# Patient Record
Sex: MALE | Race: BLACK OR AFRICAN AMERICAN
[De-identification: names, ages, dates, MRNs, and addresses within clinical notes are randomized per-mention and may not be internally consistent; named-entity substitution may affect disease eponyms.]

---

## 2020-01-04 ENCOUNTER — HOSPITAL ENCOUNTER (EMERGENCY)
Dept: HOSPITAL 8 - ED | Age: 35
Discharge: HOME | End: 2020-01-04
Payer: COMMERCIAL

## 2020-01-04 VITALS — WEIGHT: 180.38 LBS | BODY MASS INDEX: 23.15 KG/M2 | HEIGHT: 74 IN

## 2020-01-04 VITALS — SYSTOLIC BLOOD PRESSURE: 111 MMHG | DIASTOLIC BLOOD PRESSURE: 65 MMHG

## 2020-01-04 DIAGNOSIS — E16.2: Primary | ICD-10-CM

## 2020-01-04 DIAGNOSIS — R20.0: ICD-10-CM

## 2020-01-04 DIAGNOSIS — R55: ICD-10-CM

## 2020-01-04 DIAGNOSIS — H53.8: ICD-10-CM

## 2020-01-04 LAB
ALBUMIN SERPL-MCNC: 3.5 G/DL (ref 3.4–5)
ALP SERPL-CCNC: 68 U/L (ref 45–117)
ALT SERPL-CCNC: 36 U/L (ref 12–78)
ANION GAP SERPL CALC-SCNC: 4 MMOL/L (ref 5–15)
BASOPHILS # BLD AUTO: 0.03 X10^3/UL (ref 0–0.1)
BASOPHILS NFR BLD AUTO: 1 % (ref 0–1)
BILIRUB SERPL-MCNC: 0.4 MG/DL (ref 0.2–1)
CALCIUM SERPL-MCNC: 8.8 MG/DL (ref 8.5–10.1)
CHLORIDE SERPL-SCNC: 109 MMOL/L (ref 98–107)
CREAT SERPL-MCNC: 0.88 MG/DL (ref 0.7–1.3)
EOSINOPHIL # BLD AUTO: 0.1 X10^3/UL (ref 0–0.4)
EOSINOPHIL NFR BLD AUTO: 3 % (ref 1–7)
ERYTHROCYTE [DISTWIDTH] IN BLOOD BY AUTOMATED COUNT: 13.3 % (ref 9.4–14.8)
EST. AVERAGE GLUCOSE BLD GHB EST-MCNC: 108 MG/DL (ref 0–126)
LYMPHOCYTES # BLD AUTO: 1.38 X10^3/UL (ref 1–3.4)
LYMPHOCYTES NFR BLD AUTO: 37 % (ref 22–44)
MCH RBC QN AUTO: 28.3 PG (ref 27.5–34.5)
MCHC RBC AUTO-ENTMCNC: 33.3 G/DL (ref 33.2–36.2)
MCV RBC AUTO: 85 FL (ref 81–97)
MD: NO
MONOCYTES # BLD AUTO: 0.49 X10^3/UL (ref 0.2–0.8)
MONOCYTES NFR BLD AUTO: 13 % (ref 2–9)
NEUTROPHILS # BLD AUTO: 1.7 X10^3/UL (ref 1.8–6.8)
NEUTROPHILS NFR BLD AUTO: 46 % (ref 42–75)
PLATELET # BLD AUTO: 268 X10^3/UL (ref 130–400)
PMV BLD AUTO: 7.1 FL (ref 7.4–10.4)
PROT SERPL-MCNC: 7 G/DL (ref 6.4–8.2)
RBC # BLD AUTO: 5.48 X10^6/UL (ref 4.38–5.82)

## 2020-01-04 PROCEDURE — 83036 HEMOGLOBIN GLYCOSYLATED A1C: CPT

## 2020-01-04 PROCEDURE — 80053 COMPREHEN METABOLIC PANEL: CPT

## 2020-01-04 PROCEDURE — 99284 EMERGENCY DEPT VISIT MOD MDM: CPT

## 2020-01-04 PROCEDURE — 93005 ELECTROCARDIOGRAM TRACING: CPT

## 2020-01-04 PROCEDURE — 36415 COLL VENOUS BLD VENIPUNCTURE: CPT

## 2020-01-04 PROCEDURE — 85025 COMPLETE CBC W/AUTO DIFF WBC: CPT

## 2020-01-04 NOTE — NUR
RANDOM GLUCOSE 58, SNACK PROVIDED. PT STATES HE IS FEELING BETTER THOUGH. PT 
AMBULATED TO BR AND BACK INDEPENDENTLY. DENIES ANY EXTREMITY WEAKNESS AT THIS 
TIME. FRIEND AT BEDSIDE. NO OTHER NEEDS AT THIS TIME.

## 2022-08-13 ENCOUNTER — OFFICE VISIT (OUTPATIENT)
Dept: URGENT CARE | Facility: CLINIC | Age: 37
End: 2022-08-13

## 2022-08-13 VITALS
BODY MASS INDEX: 21.76 KG/M2 | TEMPERATURE: 98.8 F | RESPIRATION RATE: 14 BRPM | WEIGHT: 175 LBS | DIASTOLIC BLOOD PRESSURE: 60 MMHG | OXYGEN SATURATION: 99 % | SYSTOLIC BLOOD PRESSURE: 120 MMHG | HEIGHT: 75 IN | HEART RATE: 97 BPM

## 2022-08-13 DIAGNOSIS — Z72.52 HIGH RISK HOMOSEXUAL BEHAVIOR: ICD-10-CM

## 2022-08-13 PROCEDURE — 99203 OFFICE O/P NEW LOW 30 MIN: CPT | Performed by: NURSE PRACTITIONER

## 2022-08-13 RX ORDER — DEXTROAMPHETAMINE SACCHARATE, AMPHETAMINE ASPARTATE, DEXTROAMPHETAMINE SULFATE AND AMPHETAMINE SULFATE 3.75; 3.75; 3.75; 3.75 MG/1; MG/1; MG/1; MG/1
TABLET ORAL
COMMUNITY
Start: 2022-08-01

## 2022-08-13 RX ORDER — EMTRICITABINE AND TENOFOVIR DISOPROXIL FUMARATE 200; 300 MG/1; MG/1
1 TABLET, FILM COATED ORAL DAILY
Qty: 28 TABLET | Refills: 0 | Status: SHIPPED | OUTPATIENT
Start: 2022-08-13

## 2022-08-14 ASSESSMENT — ENCOUNTER SYMPTOMS
FATIGUE: 0
SHORTNESS OF BREATH: 0
EYE PAIN: 0
VOMITING: 0
MYALGIAS: 0
FLANK PAIN: 0
CHILLS: 0
COUGH: 0
NAUSEA: 0
SORE THROAT: 0
DIZZINESS: 0
FEVER: 0
ABDOMINAL PAIN: 0

## 2022-08-14 NOTE — PROGRESS NOTES
"Subjective:   Kodi Maria is a 36 y.o. male who presents for Other (Wants to start \"PEP\")      Exposure to STD  This is a new problem. Episode onset: 2 days; patient was intoxicated and had intercourse with new sexual partner when condom broke he is concerned of possible HIV exposure requesting to be treated prophylactically today and tested. Pertinent negatives include no abdominal pain, chest pain, chills, congestion, coughing, fatigue, fever, myalgias, nausea, rash, sore throat, urinary symptoms or vomiting. Nothing aggravates the symptoms. He has tried acetaminophen for the symptoms. The treatment provided no relief.     Review of Systems   Constitutional:  Negative for chills, fatigue and fever.   HENT:  Negative for congestion and sore throat.    Eyes:  Negative for pain.   Respiratory:  Negative for cough and shortness of breath.    Cardiovascular:  Negative for chest pain.   Gastrointestinal:  Negative for abdominal pain, nausea and vomiting.   Genitourinary:  Negative for dysuria, flank pain, frequency, hematuria and urgency.   Musculoskeletal:  Negative for myalgias.   Skin:  Negative for rash.   Neurological:  Negative for dizziness.     Medications:    amphetamine-dextroamphetamine  dolutegravir Tabs  emtricitabine-tenofovir    Allergies: Patient has no known allergies.    Problem List: Kodi Maria does not have a problem list on file.    Surgical History:  No past surgical history on file.    Past Social Hx: Kodi Maria  reports that he has never smoked. He has never used smokeless tobacco.     Past Family Hx:  Kodi Maria family history is not on file.     Problem list, medications, and allergies reviewed by myself today in Epic.     Objective:     /60 (BP Location: Left arm, Patient Position: Sitting, BP Cuff Size: Adult)   Pulse 97   Temp 37.1 °C (98.8 °F) (Temporal)   Resp 14   Ht 1.905 m (6' 3\")   Wt 79.4 kg (175 lb)   SpO2 99%   BMI 21.87 kg/m²     Physical " Exam  Constitutional:       Appearance: Normal appearance. He is not ill-appearing or toxic-appearing.   HENT:      Head: Normocephalic.      Right Ear: External ear normal.      Left Ear: External ear normal.      Nose: Nose normal.      Mouth/Throat:      Lips: Pink.      Mouth: Mucous membranes are moist.   Eyes:      General: Lids are normal.         Right eye: No discharge.         Left eye: No discharge.   Pulmonary:      Effort: Pulmonary effort is normal. No accessory muscle usage or respiratory distress.   Genitourinary:     Comments: Deferred   Musculoskeletal:         General: Normal range of motion.      Cervical back: Full passive range of motion without pain.   Skin:     Coloration: Skin is not pale.   Neurological:      Mental Status: He is alert and oriented to person, place, and time.   Psychiatric:         Mood and Affect: Mood normal.         Thought Content: Thought content normal.       Assessment/Plan:     Diagnosis and associated orders:     1. High risk homosexual behavior  dolutegravir (TIVICAY) 50 MG Tab tablet    emtricitabine-tenofovir (TRUVADA) 200-300 MG per tablet    HIV AG/AB COMBO ASSAY SCREENING         Comments/MDM:     I personally reviewed prior external notes and prior test results pertinent to today's visit.  Patient declined any further testing as he does not have insurance we will follow-up with health department  Discussed management options, risks and benefits, and alternatives to treatment plan agreed upon.   Red flags discussed and indications to immediately call 911 or present to the Emergency Department.   Supportive care, differential diagnoses, and indications for immediate follow-up discussed with patient.    Patient expresses understanding and agrees to plan. Patient denies any other questions or concerns.                Please note that this dictation was created using voice recognition software. I have made a reasonable attempt to correct obvious errors, but I  expect that there are errors of grammar and possibly content that I did not discover before finalizing the note.    This note was electronically signed by Keith MORRISSEY.

## 2022-08-15 ENCOUNTER — HOSPITAL ENCOUNTER (OUTPATIENT)
Dept: LAB | Facility: MEDICAL CENTER | Age: 37
End: 2022-08-15
Attending: NURSE PRACTITIONER
Payer: COMMERCIAL

## 2022-08-15 DIAGNOSIS — Z72.52 HIGH RISK HOMOSEXUAL BEHAVIOR: ICD-10-CM

## 2022-08-15 LAB — HIV 1+2 AB+HIV1 P24 AG SERPL QL IA: NORMAL

## 2022-08-15 PROCEDURE — 36415 COLL VENOUS BLD VENIPUNCTURE: CPT

## 2022-08-15 PROCEDURE — 87389 HIV-1 AG W/HIV-1&-2 AB AG IA: CPT

## 2022-12-05 ENCOUNTER — OFFICE VISIT (OUTPATIENT)
Dept: URGENT CARE | Facility: CLINIC | Age: 37
End: 2022-12-05
Payer: COMMERCIAL

## 2022-12-05 VITALS
TEMPERATURE: 98 F | OXYGEN SATURATION: 100 % | RESPIRATION RATE: 16 BRPM | DIASTOLIC BLOOD PRESSURE: 58 MMHG | WEIGHT: 188 LBS | HEART RATE: 70 BPM | HEIGHT: 74 IN | SYSTOLIC BLOOD PRESSURE: 110 MMHG | BODY MASS INDEX: 24.13 KG/M2

## 2022-12-05 DIAGNOSIS — M25.561 ACUTE PAIN OF RIGHT KNEE: ICD-10-CM

## 2022-12-05 PROCEDURE — 99213 OFFICE O/P EST LOW 20 MIN: CPT | Performed by: PHYSICIAN ASSISTANT

## 2022-12-05 NOTE — PROGRESS NOTES
"Subjective:   Kodi Maria is a 37 y.o. male who presents for Knee Pain (Saturday, swollen , pressure )      HPI  The patient presents to the Urgent Care with complaints of right knee pain onset 2 nights ago.  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  He noticed some pain to that area and stretched but the pain resolved.  Pain returned the next morning and gradually increased.  Located to the right lower knee.  Denies any feeling of instability.  He does have some throbbing and shooting pain worse with sitting down.  Not much pain with weightbearing however he is limping some.  Denies any numbness or tingling.  History of ACL surgery of the right knee 15 years ago          Medications:    amphetamine-dextroamphetamine  dolutegravir Tabs  emtricitabine-tenofovir    Allergies: Patient has no known allergies.    Problem List: Kodi Maria does not have a problem list on file.    Surgical History:  No past surgical history on file.    Past Social Hx: Kodi Maria  reports that he has never smoked. He has never used smokeless tobacco.     Past Family Hx:  Kodi Maria family history is not on file.     Problem list, medications, and allergies reviewed by myself today in Epic.     Objective:     /58 (BP Location: Right arm, Patient Position: Sitting, BP Cuff Size: Adult)   Pulse 70   Temp 36.7 °C (98 °F) (Temporal)   Resp 16   Ht 1.88 m (6' 2\")   Wt 85.3 kg (188 lb)   SpO2 100%   BMI 24.14 kg/m²     Physical Exam  Vitals reviewed.   Constitutional:       General: He is not in acute distress.     Appearance: Normal appearance. He is not ill-appearing or toxic-appearing.   Eyes:      Conjunctiva/sclera: Conjunctivae normal.      Pupils: Pupils are equal, round, and reactive to light.   Cardiovascular:      Rate and Rhythm: Normal rate.   Pulmonary:      Effort: Pulmonary effort is normal.   Musculoskeletal:      Cervical back: Neck supple. No " rigidity.      Right knee: Swelling (mild) present. No erythema, ecchymosis, lacerations, bony tenderness or crepitus. Normal range of motion. Tenderness (pes anserine area) present. No LCL laxity, MCL laxity or ACL laxity. Normal alignment and normal patellar mobility.      Instability Tests: Anterior drawer test negative. Medial Franca test negative and lateral Franca test negative.      Right lower leg: Normal.   Skin:     General: Skin is dry.   Neurological:      General: No focal deficit present.      Mental Status: He is alert and oriented to person, place, and time.   Psychiatric:         Mood and Affect: Mood normal.         Behavior: Behavior normal.       Diagnosis and associated orders:     1. Acute pain of right knee  - Referral to Sports Medicine     Comments/MDM:     Patient's presenting symptoms and exam findings consistent most likely with knee strain versus pes anserine bursitis most likely dancing.  He is a dancer and dances almost every day. The knee appears to be stable.  Negative anterior drawers and negative Franca's.  No joint line tenderness.  Recommend knee sleeve brace for compression and some support.  Elevation, ice application, ibuprofen.  Recommend no dancing for at least 1 to 2 weeks until significant improvement.  If no improvement in 1 to 2 weeks or any worsening, recommend following up with sports medicine.       I personally reviewed prior external notes and test results pertinent to today's visit. Pathogenesis of diagnosis discussed including typical length and natural progression. Supportive care, natural history, differential diagnoses, and indications for immediate follow-up discussed. Patient expresses understanding and agrees to plan. Patient denies any other questions or concerns.     Follow-up with the primary care physician for recheck, reevaluation, and consideration of further management.    Please note that this dictation was created using voice recognition  software. I have made a reasonable attempt to correct obvious errors, but I expect that there are errors of grammar and possibly content that I did not discover before finalizing the note.    This note was electronically signed by Luis Fernando Rodriguez PA-C

## 2024-09-16 ENCOUNTER — OFFICE VISIT (OUTPATIENT)
Dept: URGENT CARE | Facility: CLINIC | Age: 39
End: 2024-09-16

## 2024-09-16 VITALS
OXYGEN SATURATION: 97 % | SYSTOLIC BLOOD PRESSURE: 102 MMHG | RESPIRATION RATE: 14 BRPM | HEIGHT: 74 IN | HEART RATE: 75 BPM | DIASTOLIC BLOOD PRESSURE: 60 MMHG | TEMPERATURE: 97.9 F | WEIGHT: 187.8 LBS | BODY MASS INDEX: 24.1 KG/M2

## 2024-09-16 DIAGNOSIS — B34.9 ACUTE VIRAL SYNDROME: ICD-10-CM

## 2024-09-16 DIAGNOSIS — R59.0 CERVICAL ADENOPATHY: ICD-10-CM

## 2024-09-16 DIAGNOSIS — J02.9 PHARYNGITIS, UNSPECIFIED ETIOLOGY: ICD-10-CM

## 2024-09-16 DIAGNOSIS — R68.83 CHILLS: ICD-10-CM

## 2024-09-16 LAB
FLUAV RNA SPEC QL NAA+PROBE: NEGATIVE
FLUBV RNA SPEC QL NAA+PROBE: NEGATIVE
RSV RNA SPEC QL NAA+PROBE: NEGATIVE
SARS-COV-2 RNA RESP QL NAA+PROBE: NEGATIVE

## 2024-09-16 PROCEDURE — 3074F SYST BP LT 130 MM HG: CPT

## 2024-09-16 PROCEDURE — 3078F DIAST BP <80 MM HG: CPT

## 2024-09-16 PROCEDURE — 99212 OFFICE O/P EST SF 10 MIN: CPT

## 2024-09-16 PROCEDURE — 0241U POCT CEPHEID COV-2, FLU A/B, RSV - PCR: CPT

## 2024-09-16 ASSESSMENT — ENCOUNTER SYMPTOMS
EYE DISCHARGE: 0
FEVER: 0
HEADACHES: 0
VOMITING: 0
SHORTNESS OF BREATH: 0
DIARRHEA: 0
SPUTUM PRODUCTION: 0
BLOOD IN STOOL: 0
MYALGIAS: 0
NAUSEA: 0
COUGH: 0
CHILLS: 1
SORE THROAT: 1
ABDOMINAL PAIN: 0
DIAPHORESIS: 0
BLURRED VISION: 0
PSYCHIATRIC NEGATIVE: 1
WEAKNESS: 0
WHEEZING: 0
SINUS PAIN: 0
DIZZINESS: 0

## 2024-09-16 NOTE — PROGRESS NOTES
"Subjective:   Kodi Maria is a 39 y.o. male who presents for Sore Throat (Started last Wednesday and has swollen lymph node and painful ) and Chills      HPI  Patient complains of sore throat, swollen lymph nodes, and chills for 5 days.    Tylenol at 0900    Negative: shortness of breath, cough, sputum production, wheezing, dyspnea, rhonchi, hypoxia, respiratory distress, chest pain, body aches, fever, muffled voice, drooling, dizziness, fatigue, weakness, sleep disturbance, post nasal drip, sinus pressure, headaches, vision changes, abdominal pain, nausea, vomiting, diarrhea, recent travel,  history of second hand smoke exposure, unintentional weight loss, night sweats        Review of Systems   Constitutional:  Positive for chills. Negative for diaphoresis, fever and malaise/fatigue.   HENT:  Positive for ear pain and sore throat. Negative for congestion and sinus pain.    Eyes:  Negative for blurred vision and discharge.   Respiratory:  Negative for cough, sputum production, shortness of breath and wheezing.    Cardiovascular:  Negative for chest pain.   Gastrointestinal:  Negative for abdominal pain, blood in stool, diarrhea, nausea and vomiting.   Genitourinary: Negative.    Musculoskeletal:  Negative for myalgias.   Skin:  Negative for rash.   Neurological:  Negative for dizziness, weakness and headaches.   Endo/Heme/Allergies: Negative.    Psychiatric/Behavioral: Negative.     All other systems reviewed and are negative.      Medical History:  History reviewed. No pertinent past medical history.    Allergies:  No Known Allergies    Social history, surgical history, medications, and current problem list reviewed today in Epic.       Objective:     /60 (BP Location: Left arm, Patient Position: Sitting, BP Cuff Size: Adult)   Pulse 75   Temp 36.6 °C (97.9 °F) (Temporal)   Resp 14   Ht 1.88 m (6' 2\")   Wt 85.2 kg (187 lb 12.8 oz)   SpO2 97%     Physical Exam  Vitals reviewed.   Constitutional:     "   General: He is not in acute distress.     Appearance: Normal appearance. He is not ill-appearing, toxic-appearing or diaphoretic.   HENT:      Head: Normocephalic.      Jaw: There is normal jaw occlusion.      Right Ear: Tympanic membrane, ear canal and external ear normal.      Left Ear: Tympanic membrane, ear canal and external ear normal.      Nose: Nose normal. No congestion or rhinorrhea.      Right Sinus: No maxillary sinus tenderness or frontal sinus tenderness.      Left Sinus: No maxillary sinus tenderness or frontal sinus tenderness.      Mouth/Throat:      Lips: Pink.      Mouth: Mucous membranes are moist.      Tongue: Tongue does not deviate from midline.      Pharynx: Oropharynx is clear. Uvula midline. Posterior oropharyngeal erythema present. No oropharyngeal exudate or uvula swelling.   Eyes:      Extraocular Movements: Extraocular movements intact.      Conjunctiva/sclera: Conjunctivae normal.      Pupils: Pupils are equal, round, and reactive to light.   Cardiovascular:      Rate and Rhythm: Normal rate and regular rhythm.      Pulses: Normal pulses.      Heart sounds: Normal heart sounds.   Pulmonary:      Effort: Pulmonary effort is normal.      Breath sounds: Normal breath sounds.   Abdominal:      General: Abdomen is flat.      Palpations: Abdomen is soft.      Tenderness: There is no abdominal tenderness.   Musculoskeletal:         General: Normal range of motion.      Cervical back: Normal range of motion and neck supple.   Lymphadenopathy:      Cervical: Cervical adenopathy present.   Skin:     General: Skin is warm.      Capillary Refill: Capillary refill takes less than 2 seconds.   Neurological:      General: No focal deficit present.      Mental Status: He is alert and oriented to person, place, and time.   Psychiatric:         Mood and Affect: Mood normal.         Behavior: Behavior normal.         Assessment/Plan:       Diagnosis and associated orders:     1. Acute viral  syndrome    2. Chills  - POCT CEPHEID COV-2, FLU A/B, RSV - PCR    3. Pharyngitis, unspecified etiology    4. Cervical adenopathy     Comments/MDM:       39-year-old afebrile, hemodynamically stable, well-appearing male presenting with complaints of ear pain, sore throat, chills.  He is requesting in clinic viral testing.  In clinic viral testing was negative  Differential diagnosis considered allergic rhinitis, otitis media, asthma, influenza, pneumonia, pertussis, epiglottitis, bronchitis, however, given physical exam and history I believe these are less likely.  Patient encouraged to increase fluids and rest, cool-mist humidifier, zinc 75 mg daily, saline nasal rinse with distilled water, cough drops, salt water gargles.  At this time I do not believe antibiotics would improve patient's symptoms.  Patient encouraged to follow-up with the clinic in 48 hours for re-evaluation as needed.  Patient given strict instructions to follow up with the emergency room if they develop worsening symptoms.  Patient verbalized understanding.      Patient is clinically stable at today's acute urgent care visit. Vital signs are normal and reassuring.  No acute distress noted. Appropriate for outpatient management at this time. No red flag warnings noted.  Patient given strict instructions to follow up with emergency room if they develop any red flag warnings which were discussed in depth.  They verbalized understanding.      Differential diagnosis, natural history, supportive care, and indications for immediate follow-up discussed. All questions answered. Patient agrees with the plan of care. Advised the patient to follow-up with the primary care physician for recheck, reevaluation, and consideration of further management or the emergency room for worsening symptoms.      Please note that this dictation was created using voice recognition software. I have made every reasonable attempt to correct obvious errors, but I expect that  there are errors of grammar and possibly content that I did not discover before finalizing the note.

## 2025-01-09 DIAGNOSIS — N48.9 ABNORMALITY OF PENIS: ICD-10-CM

## 2025-01-10 ENCOUNTER — APPOINTMENT (OUTPATIENT)
Dept: RADIOLOGY | Facility: MEDICAL CENTER | Age: 40
End: 2025-01-10
Payer: COMMERCIAL

## 2025-01-10 DIAGNOSIS — N48.9 ABNORMALITY OF PENIS: ICD-10-CM

## 2025-01-10 PROCEDURE — 76870 US EXAM SCROTUM: CPT

## 2025-02-20 ENCOUNTER — OFFICE VISIT (OUTPATIENT)
Dept: UROLOGY | Facility: MEDICAL CENTER | Age: 40
End: 2025-02-20
Payer: COMMERCIAL

## 2025-02-20 DIAGNOSIS — N50.3 EPIDIDYMAL CYST: ICD-10-CM

## 2025-02-20 PROCEDURE — 99203 OFFICE O/P NEW LOW 30 MIN: CPT | Performed by: UROLOGY

## 2025-02-20 NOTE — PROGRESS NOTES
Chief Complaint: left scrotal pain    HPI: Kodi Maria is a 39 y.o. male with a history of left sided scrotal pain that he first noted in August or September 2024. At the time he does not recall any trauma to the external genitalia. He also went to [facility] and had a negative STI panel. He's been able to feel something behind the left testicle, but a recent scrotal ultrasound was normal.    He feels the pain as a sudden ache or even burn; he hasn't identified any clear triggers, but it goes away as long as he rests and doesn't touch it within about 30 minutes.     He's had no hematuria, but has felt intermittent dysuria for years. This has occurred since he had an episode of prostatitis when he was very young.     His medical history is significant for SLE, which has lead to rashes and joint pain.     Past Medical History:  History reviewed. No pertinent past medical history.    Past Surgical History:  History reviewed. No pertinent surgical history.    Family History:  History reviewed. No pertinent family history.    Social History:  Social History     Socioeconomic History    Marital status: Single     Spouse name: Not on file    Number of children: Not on file    Years of education: Not on file    Highest education level: Not on file   Occupational History    Not on file   Tobacco Use    Smoking status: Never    Smokeless tobacco: Never   Vaping Use    Vaping status: Never Used   Substance and Sexual Activity    Alcohol use: Not on file    Drug use: Not on file    Sexual activity: Not on file   Other Topics Concern    Not on file   Social History Narrative    Not on file     Social Drivers of Health     Financial Resource Strain: Not on file   Food Insecurity: Not on file   Transportation Needs: Not on file   Physical Activity: Not on file   Stress: Not on file   Social Connections: Not on file   Intimate Partner Violence: Not on file   Housing Stability: Not on file  "      Medications:  Current Outpatient Medications   Medication Sig Dispense Refill    amphetamine-dextroamphetamine (ADDERALL) 15 MG tablet TAKE 1 TABLET BY MOUTH TWICE DAILY AT BREAKFAST AND LUNCH FOR FOCUS      dolutegravir (TIVICAY) 50 MG Tab tablet Take 1 Tablet by mouth every day. (Patient not taking: Reported on 12/5/2022) 28 Tablet 0    emtricitabine-tenofovir (TRUVADA) 200-300 MG per tablet Take 1 Tablet by mouth every day. (Patient not taking: Reported on 12/5/2022) 28 Tablet 0     No current facility-administered medications for this visit.       Allergies:  No Known Allergies    Review of Systems:  Constitutional: Negative for fever, chills and malaise/fatigue.   HENT: Negative for congestion.    Eyes: Negative for pain.   Respiratory: Negative for cough and shortness of breath.    Cardiovascular: Negative for leg swelling.   Gastrointestinal: Negative for nausea, vomiting, abdominal pain and diarrhea.   Genitourinary: Negative for dysuria and hematuria.   Skin: Negative for rash.   Neurological: Negative for dizziness, focal weakness and headaches.   Endo/Heme/Allergies: Does not bruise/bleed easily.   Psychiatric/Behavioral: Negative for depression.  The patient is not nervous/anxious.        Physical Exam:  There were no vitals filed for this visit.    GENERAL: well appearing, well nourished, NAD  RESP: respiratory effort normal  ABDOMEN: soft, nontender, nondistended, no masses or organomegaly  HERNIAS: no hernias found on exam  SKIN/LYMPH: normal coloration and turgor, no suspicious skin lesions noted  NEURO/PSYCH: alert, oriented, normal speech, no focal findings or movement disorder noted  EXTREMITIES: no pedal edema noted  GENITOURINARY: normal male external genitalia, penis is normal, testes descended bilaterally, and small (0.5 cm) left epididymal cyst in the main body of the epididymis      Data Review:    Labs:  POCT UA No results found for: \"POCCOLOR\", \"POCAPPEAR\", \"POCLEUKEST\", " "\"POCNITRITE\", \"POCUROBILIGE\", \"POCPROTEIN\", \"POCURPH\", \"POCBLOOD\", \"POCSPGRV\", \"POCKETONES\", \"POCBILIRUBIN\", \"POCGLUCUA\"   CBC   Lab Results   Component Value Date/Time    RBC 5.15 08/06/2021 1054    HEMOGLOBIN 14.8 05/16/2023 0902    HEMATOCRIT 43.2 05/16/2023 0902    MCV 85 05/16/2023 0902    MCH 29.2 05/16/2023 0902    MCHC 34.3 05/16/2023 0902    RDW 13.4 05/16/2023 0902    MPV 8 08/06/2021 1054    LYMPHOCYTES 28 05/16/2023 0902    LYMPHS 1.6 05/16/2023 0902    MONOCYTES 10 05/16/2023 0902    MONOS 0.6 05/16/2023 0902    EOSINOPHILS 3 05/16/2023 0902    EOS 0.2 05/16/2023 0902    BASOPHILS 1 05/16/2023 0902    BASO 0.1 05/16/2023 0902       CMP   Lab Results   Component Value Date/Time    SODIUM 142 02/04/2021 1502    POTASSIUM 4 02/04/2021 1502    CHLORIDE 111 (H) 02/04/2021 1502    CO2 25 02/04/2021 1502    ANION 6 02/04/2021 1502    GLUCOSE 78 02/04/2021 1502    BUN 14 02/04/2021 1502    CREATININE 0.98 02/04/2021 1502    CALCIUM 8.9 02/04/2021 1502    ASTSGOT 52 (H) 02/04/2021 1502    ALTSGPT 62 02/04/2021 1502    ALKPHOSPHAT 72 02/04/2021 1502    TBILIRUBIN 0.4 02/04/2021 1502    ALBUMIN 3.6 02/04/2021 1502    TOTPROTEIN 6.5 02/04/2021 1502       Imaging:     [id number]  Order: [id number]   Status: Final result       Next appt: None       Dx: Abnormality of penis    Test Result Released: Yes (seen)    0 Result Notes  Details    Reading Physician Reading Date Result Priority   Julius Rasmussen M.D.  [phone]     1/10/2025      Narrative & Impression     1/10/2025 8:24 AM     HISTORY/REASON FOR EXAM: mass on dorsal aspect of penis, please evaluate with scrotum contents.     TECHNIQUE/EXAM DESCRIPTION:  Real-time sonography of the scrotum was performed with gray-scale, color and duplex Doppler imaging.     COMPARISON: None     FINDINGS:     The right testis measures 5.15 cm x 2.54 cm x 3.63 cm. Normal in size and echotexture. Normal vascularity on color Doppler. No intratesticular mass.     The " left testis measures 3.96 cm x 2.56 cm x 3.39 cm. Normal in size and echotexture. Normal vascularity on color Doppler. No intratesticular mass.     Vascular flow is symmetric.     Appearance of the epididymides are within normal limits.     No abnormal volume hydrocele is detected.     No varicocele is detected.     Ultrasound in region of palpable abnormality demonstrates normal spermatic cord.     IMPRESSION:     1.  Normal scrotum ultrasound.       Assessment: 39 y.o. male with a history of left scrotal pain since August/September 2024, with negative UTI and STI testing at that time, and no history of significant LUTS or urethral discharge, nor any pelvic or genital trauma. On exam he has a small and simple left epididymal cyst, but this is not tender and not likely the etiology of the intermittent pain.     He may have an a viral epididymitis or epididymitis secondary to an atypical organism like mycoplasma or ureaplasma, but given that he has no urethral symptoms or discharge I believe repeat testing at this time (5 months later) would not give us a useful result.     We did discuss the common course of scrotal pain after an episode of epididymitis, and I explained both conservative treatments and use of NSAIDS if needed for the pain.       Plan:    -Keep scrotum elevated with supportive underwear, and place a bolster behind the scrotum (rolled towel, tissues, etc) within the underwear when laying on your back to further elevate the scrotum.   -Use ice packs applied to the left scrotum as needed for flares of pain  -You can take over the counter ibuprofen as needed for pain, and if this is insufficient we can prescribe a course of a stronger non-steroidal antiinflammatory medication   -Consider use of daily warm Epsom salt baths for improved pelvic floor relaxation  -If you have any constipation, manage this with stool softeners, fiber supplements, or polyethylene glycol (ie, Miralax) as needed as constipation  can contribute to pelvic and genital pain  -Call or message if any progression or bothersome persistence in symptoms    Jose Becerra MD

## 2025-05-02 DIAGNOSIS — Z11.3 SCREENING EXAMINATION FOR STI: ICD-10-CM

## 2025-05-07 ENCOUNTER — HOSPITAL ENCOUNTER (OUTPATIENT)
Dept: LAB | Facility: MEDICAL CENTER | Age: 40
End: 2025-05-07
Payer: COMMERCIAL

## 2025-05-07 DIAGNOSIS — Z11.3 SCREENING EXAMINATION FOR STI: ICD-10-CM

## 2025-05-07 LAB
HBV SURFACE AG SER QL: NORMAL
HCV AB SER QL: NORMAL
HIV 1+2 AB+HIV1 P24 AG SERPL QL IA: NORMAL
T PALLIDUM AB SER QL IA: NORMAL

## 2025-05-07 PROCEDURE — 87389 HIV-1 AG W/HIV-1&-2 AB AG IA: CPT

## 2025-05-07 PROCEDURE — 87491 CHLMYD TRACH DNA AMP PROBE: CPT

## 2025-05-07 PROCEDURE — 87340 HEPATITIS B SURFACE AG IA: CPT

## 2025-05-07 PROCEDURE — 87591 N.GONORRHOEAE DNA AMP PROB: CPT

## 2025-05-07 PROCEDURE — 86696 HERPES SIMPLEX TYPE 2 TEST: CPT

## 2025-05-07 PROCEDURE — 86694 HERPES SIMPLEX NES ANTBDY: CPT

## 2025-05-07 PROCEDURE — 86780 TREPONEMA PALLIDUM: CPT

## 2025-05-07 PROCEDURE — 86695 HERPES SIMPLEX TYPE 1 TEST: CPT

## 2025-05-07 PROCEDURE — 36415 COLL VENOUS BLD VENIPUNCTURE: CPT

## 2025-05-07 PROCEDURE — 86803 HEPATITIS C AB TEST: CPT

## 2025-05-08 ENCOUNTER — RESULTS FOLLOW-UP (OUTPATIENT)
Dept: HOSPITALIST | Facility: MEDICAL CENTER | Age: 40
End: 2025-05-08

## 2025-05-08 LAB
C TRACH DNA SPEC QL NAA+PROBE: NEGATIVE
N GONORRHOEA DNA SPEC QL NAA+PROBE: NEGATIVE
SPECIMEN SOURCE: NORMAL

## 2025-05-09 LAB — HSV1+2 IGG SER IA-ACNC: 5.31 IV

## 2025-05-10 LAB
HSV1 GG IGG SER-ACNC: 10.9 IV
HSV2 GG IGG SER-ACNC: 0.11 IV